# Patient Record
Sex: FEMALE | Race: AMERICAN INDIAN OR ALASKA NATIVE
[De-identification: names, ages, dates, MRNs, and addresses within clinical notes are randomized per-mention and may not be internally consistent; named-entity substitution may affect disease eponyms.]

---

## 2020-03-12 ENCOUNTER — HOSPITAL ENCOUNTER (EMERGENCY)
Dept: HOSPITAL 5 - ED | Age: 40
Discharge: HOME | End: 2020-03-12
Payer: COMMERCIAL

## 2020-03-12 VITALS — DIASTOLIC BLOOD PRESSURE: 69 MMHG | SYSTOLIC BLOOD PRESSURE: 138 MMHG

## 2020-03-12 DIAGNOSIS — J06.9: Primary | ICD-10-CM

## 2020-03-12 DIAGNOSIS — H10.9: ICD-10-CM

## 2020-03-12 LAB
BASOPHILS # (AUTO): 0.1 K/MM3 (ref 0–0.1)
BASOPHILS NFR BLD AUTO: 0.8 % (ref 0–1.8)
BUN SERPL-MCNC: 11 MG/DL (ref 7–17)
BUN/CREAT SERPL: 18 %
CALCIUM SERPL-MCNC: 9.2 MG/DL (ref 8.4–10.2)
EOSINOPHIL # BLD AUTO: 0.2 K/MM3 (ref 0–0.4)
EOSINOPHIL NFR BLD AUTO: 2 % (ref 0–4.3)
HCT VFR BLD CALC: 39.2 % (ref 30.3–42.9)
HEMOLYSIS INDEX: 39
HGB BLD-MCNC: 12.6 GM/DL (ref 10.1–14.3)
LYMPHOCYTES # BLD AUTO: 1.5 K/MM3 (ref 1.2–5.4)
LYMPHOCYTES NFR BLD AUTO: 17.2 % (ref 13.4–35)
MCHC RBC AUTO-ENTMCNC: 32 % (ref 30–34)
MCV RBC AUTO: 81 FL (ref 79–97)
MONOCYTES # (AUTO): 0.7 K/MM3 (ref 0–0.8)
MONOCYTES % (AUTO): 7.8 % (ref 0–7.3)
PLATELET # BLD: 332 K/MM3 (ref 140–440)
RBC # BLD AUTO: 4.84 M/MM3 (ref 3.65–5.03)

## 2020-03-12 PROCEDURE — 99283 EMERGENCY DEPT VISIT LOW MDM: CPT

## 2020-03-12 PROCEDURE — 36415 COLL VENOUS BLD VENIPUNCTURE: CPT

## 2020-03-12 PROCEDURE — 85025 COMPLETE CBC W/AUTO DIFF WBC: CPT

## 2020-03-12 PROCEDURE — 80048 BASIC METABOLIC PNL TOTAL CA: CPT

## 2020-03-12 PROCEDURE — 71046 X-RAY EXAM CHEST 2 VIEWS: CPT

## 2020-03-12 NOTE — EMERGENCY DEPARTMENT REPORT
Minor Respiratory





- HPI


Chief Complaint: Upper Respiratory Infection


Stated Complaint: FEVER/COUGH


Time Seen by Provider: 03/12/20 07:26


Duration: 6 days


Pain Location: Throat


Severity: mild


Minor Respiratory: Yes Able to Tolerate Fluids, Yes Ear Pain, Yes Cough, Yes Fev

er, No Rhinorrhea, No Sore Throat, No Sick Contacts, No Hemoptysis, No Chest 

Pain, No Shortness of Breath


Other History: This is a 39-year-old female with no prior medical history 

presents to ED complaining of fever and cold-like symptoms such as coughing, 

sore throat and ear pain for the past 6 days.  Patient states that she was on a 

cruise to [place] that returned on March 2 patient states March 6 her symptoms 

started.  Patient states she has been taking TheraFlu with no relief.  Patient 

states she is unsure of sick contacts.  Patient denies chest pain, shortness of 

breath, history of COPD or asthma.  Patient does mention that yesterday as she 

was driving she felt her left eye itching so she rubbed it for quite some time 

and today when she woke up she started it was a bit pink and some dried yellow 

discharge to the left eye.  She denies vision loss, blurry vision, foreign 

object or eye.





ED Review of Systems


ROS: 


Stated complaint: FEVER/COUGH


Other details as noted in HPI





Comment: All other systems reviewed and negative





ED Past Medical Hx





- Past Medical History


Previous Medical History?: No





- Surgical History


Past Surgical History?: No





- Social History


Smoking Status: Never Smoker


Substance Use Type: None





- Medications


Home Medications: 


                                Home Medications











 Medication  Instructions  Recorded  Confirmed  Last Taken  Type


 


Benzonatate [Tessalon Perles] 100 mg PO Q8HR #30 capsule 03/12/20  Unknown Rx


 


Tobramycin 0.3% [Tobrex] 1 - 2 drop OP Q8HR #1 bottle 03/12/20  Unknown Rx














Minor Respiratory Exam





- Exam


General: 


Vital signs noted. No distress. Alert and acting appropriately.





HEENT: Yes Moist Mucous Membranes (No orbital swelling, redness or tenderness), 

No Pharyngeal Erythema, No Pharyngeal Exudates, No Rhinorrhea, No Conjuctival 

Injection, No Frontal Tenderness, No Maxillary Tenderness


Ear: Neither TM Bulge, Neither TM Erythema, Neither EAC Pain, Neither EAC 

Discharge


Neck: Yes Supple, No Adenopathy


Lungs: Yes Good Air Exchange, No Wheezes, No Ronchi, No Stridor, No Cough, No 

Labored Respirations, No Retractions, No Use of Accessory Muscles, No Other 

Abnormal Lung Sounds


Heart: Yes Regular, No Murmur


Abdomen: Yes Normal Bowel Sounds, No Tenderness, No Peritoneal Signs


Skin: No Rash, No Edema


Neurologic: 


Alert and oriented, no deficits.








Musculoskeletal: 


Unremarkable.











ED Course


                                   Vital Signs











  03/12/20





  05:49


 


Temperature 100.9 F H


 


Pulse Rate 101 H


 


Respiratory 18





Rate 


 


Blood Pressure 138/69


 


O2 Sat by Pulse 97





Oximetry 














ED Medical Decision Making





- Lab Data


Result diagrams: 


                                 03/12/20 06:31





                                 03/12/20 06:31





                             Laboratory Last Values











WBC  8.6 K/mm3 (4.5-11.0)   03/12/20  06:31    


 


RBC  4.84 M/mm3 (3.65-5.03)   03/12/20  06:31    


 


Hgb  12.6 gm/dl (10.1-14.3)   03/12/20  06:31    


 


Hct  39.2 % (30.3-42.9)   03/12/20  06:31    


 


MCV  81 fl (79-97)   03/12/20  06:31    


 


MCH  26 pg (28-32)  L  03/12/20  06:31    


 


MCHC  32 % (30-34)   03/12/20  06:31    


 


RDW  14.7 % (13.2-15.2)   03/12/20  06:31    


 


Plt Count  332 K/mm3 (140-440)   03/12/20  06:31    


 


Lymph % (Auto)  17.2 % (13.4-35.0)   03/12/20  06:31    


 


Mono % (Auto)  7.8 % (0.0-7.3)  H  03/12/20  06:31    


 


Eos % (Auto)  2.0 % (0.0-4.3)   03/12/20  06:31    


 


Baso % (Auto)  0.8 % (0.0-1.8)   03/12/20  06:31    


 


Lymph #  1.5 K/mm3 (1.2-5.4)   03/12/20  06:31    


 


Mono #  0.7 K/mm3 (0.0-0.8)   03/12/20  06:31    


 


Eos #  0.2 K/mm3 (0.0-0.4)   03/12/20  06:31    


 


Baso #  0.1 K/mm3 (0.0-0.1)   03/12/20  06:31    


 


Seg Neutrophils %  72.2 % (40.0-70.0)  H  03/12/20  06:31    


 


Seg Neutrophils #  6.2 K/mm3 (1.8-7.7)   03/12/20  06:31    


 


Sodium  137 mmol/L (137-145)   03/12/20  06:31    


 


Potassium  4.1 mmol/L (3.6-5.0)   03/12/20  06:31    


 


Chloride  101.4 mmol/L ()   03/12/20  06:31    


 


Carbon Dioxide  22 mmol/L (22-30)   03/12/20  06:31    


 


Anion Gap  18 mmol/L  03/12/20  06:31    


 


BUN  11 mg/dL (7-17)   03/12/20  06:31    


 


Creatinine  0.6 mg/dL (0.7-1.2)  L  03/12/20  06:31    


 


Estimated GFR  > 60 ml/min  03/12/20  06:31    


 


BUN/Creatinine Ratio  18 %  03/12/20  06:31    


 


Glucose  108 mg/dL ()  H  03/12/20  06:31    


 


Calcium  9.2 mg/dL (8.4-10.2)   03/12/20  06:31    














- Radiology Data


Radiology results: report reviewed, image reviewed





 Fluoro Time In Minutes: 





 CHEST 2 VIEWS 





INDICATION / CLINICAL INFORMATION: 


productive cough, fever, chills. 





COMPARISON: 


None available. 





FINDINGS: 





SUPPORT DEVICES: None. 





HEART / MEDIASTINUM: No significant abnormality. 





LUNGS / PLEURA: Mild pulmonary vascular congestion. No evidence of pneumonia or 

pleural effusion. 


 No pneumothorax. 





ADDITIONAL FINDINGS: No significant additional findings. 





IMPRESSION: 


1. Mild pulmonary vascular congestion. No evidence of pneumonia at this time. 





Signer Name: Jovanna Serrato MD 


Signed: 3/12/2020 7:31 AM 


Workstation Name: [id number] 








 Transcribed By: JR 


 Dictated By: Jovanna Serrato MD 


 Electronically Authenticated By: Jovanna Serrato MD 


 Signed Date/Time: 03/12/20 0731 








- Medical Decision Making


39-year-old female presents with flulike symptoms.


Fever resolved during the ED stay.


Chest x-ray shows no acute findings for pneumonia or any lung disease.


Discussed with patient to continue symptomatic relief with over-the-counter 

medications such as Tylenol or Motrin for fever, Robitussin for cough


Discussed continue Tylenol and Motrin as needed for fever and pain.


Discussed increase fluids and diet intake.


Discussed rest much needed.


Discussed with the patient to continue daily vitamin C for immune booster.


Discussed follow-up with primary care physician in 3-5 days,


She agrees to following instructions and follow-up


Discussed with patient if symptoms worsen she may return to ED immediately 


Vital signs stable.  Patient is in no acute distress





Noted that patient does have a travel history to [place] which is a low suspicion

 ready for work over 19.


CBC and BMP shows no abnormalities there are no leukocytosis which does not 

suggest some acute infection.





Critical care attestation.: 


If time is entered above; I have spent that time in minutes in the direct care 

of this critically ill patient, excluding procedure time.








ED Disposition


Clinical Impression: 


 Upper respiratory infection, Acute conjunctivitis of left eye





Disposition: DC-01 TO HOME OR SELFCARE


Is pt being admited?: No


Does the pt Need Aspirin: No


Condition: Stable


Instructions:  Conjunctivitis (ED), Upper Respiratory Infection (ED), Viral 

Syndrome (ED)


Additional Instructions: 


Make sure to follow up with the primary care physician as discussed.


Take all your medications as you've been prescribed.


If you have any worsening symptoms or develop new symptoms please return to ED 

immediately.


Prescriptions: 


Benzonatate [Tessalon Perles] 100 mg PO Q8HR #30 capsule


Tobramycin 0.3% [Tobrex] 1 - 2 drop OP Q8HR #1 bottle


Referrals: 


[facility] [Outside] - 3-5 Days


[facility] [Outside] - 3-5 Days


Forms:  Accompanied Note, Work/School Release Form(ED)


Time of Disposition: 07:57

## 2020-03-12 NOTE — XRAY REPORT
CHEST 2 VIEWS 



INDICATION / CLINICAL INFORMATION:

productive cough, fever, chills.



COMPARISON: 

None available.



FINDINGS:



SUPPORT DEVICES: None.



HEART / MEDIASTINUM: No significant abnormality. 



LUNGS / PLEURA: Mild pulmonary vascular congestion. No evidence of pneumonia or pleural effusion. No 
pneumothorax. 



ADDITIONAL FINDINGS: No significant additional findings.



IMPRESSION:

1. Mild pulmonary vascular congestion. No evidence of pneumonia at this time.



Signer Name: Jovanna Serrato MD 

Signed: 3/12/2020 7:31 AM

Workstation Name: [id number]